# Patient Record
Sex: MALE | Race: WHITE | Employment: FULL TIME | URBAN - METROPOLITAN AREA
[De-identification: names, ages, dates, MRNs, and addresses within clinical notes are randomized per-mention and may not be internally consistent; named-entity substitution may affect disease eponyms.]

---

## 2017-11-12 ENCOUNTER — OFFICE VISIT (OUTPATIENT)
Dept: FAMILY MEDICINE CLINIC | Age: 52
End: 2017-11-12
Payer: COMMERCIAL

## 2017-11-12 VITALS
HEART RATE: 74 BPM | SYSTOLIC BLOOD PRESSURE: 120 MMHG | TEMPERATURE: 97.9 F | DIASTOLIC BLOOD PRESSURE: 76 MMHG | HEIGHT: 78 IN | WEIGHT: 274 LBS | BODY MASS INDEX: 31.7 KG/M2

## 2017-11-12 DIAGNOSIS — M54.50 ACUTE RIGHT-SIDED LOW BACK PAIN WITHOUT SCIATICA: Primary | ICD-10-CM

## 2017-11-12 PROCEDURE — 99213 OFFICE O/P EST LOW 20 MIN: CPT | Performed by: INTERNAL MEDICINE

## 2017-11-12 RX ORDER — TADALAFIL 20 MG
1 TABLET ORAL PRN
Refills: 2 | COMMUNITY
Start: 2017-10-29

## 2017-11-12 RX ORDER — CYCLOBENZAPRINE HCL 10 MG
10 TABLET ORAL 3 TIMES DAILY PRN
Qty: 21 TABLET | Refills: 0 | Status: SHIPPED | OUTPATIENT
Start: 2017-11-12 | End: 2017-11-12 | Stop reason: SDUPTHER

## 2017-11-12 RX ORDER — AMLODIPINE AND OLMESARTAN MEDOXOMIL 5; 40 MG/1; MG/1
1 TABLET ORAL DAILY
COMMUNITY
Start: 2017-11-09

## 2017-11-12 RX ORDER — CYCLOBENZAPRINE HCL 10 MG
10 TABLET ORAL 3 TIMES DAILY PRN
Qty: 21 TABLET | Refills: 0 | Status: SHIPPED | OUTPATIENT
Start: 2017-11-12 | End: 2017-11-22

## 2017-11-12 ASSESSMENT — ENCOUNTER SYMPTOMS
SHORTNESS OF BREATH: 0
COUGH: 0
BACK PAIN: 1

## 2017-11-12 NOTE — LETTER
[facility]   [address] [address]  Phone: [phone]  Fax: [phone]         November 12, 2017     Patient: Bartolo Guerrero   YOB: 1965   Date of Visit: 11/12/2017       To Whom It May Concern:    Bartolo Guerrero was seen and treated in our emergency department on 11/12/2017. The following work duties are recommended. He should remain out of work until 11/15/17    Treatment and work recommendations given by the emergency department are initial emergency measures only, and follow up should be arranged as soon as possible with the company's occupational health provider* or the specialist to whom the worker was referred. *If the company does not have an occupational health provider, follow up should be at No follow-up provider specified.         Sincerely,        Natalie Gonzalez MD        Signature:__________________________________

## 2017-11-12 NOTE — PATIENT INSTRUCTIONS
Patient Education        Low Back Pain: Exercises  Your Care Instructions  Here are some examples of typical rehabilitation exercises for your condition. Start each exercise slowly. Ease off the exercise if you start to have pain. Your doctor or physical therapist will tell you when you can start these exercises and which ones will work best for you. How to do the exercises  Press-up    1. Lie on your stomach, supporting your body with your forearms. 2. Press your elbows down into the floor to raise your upper back. As you do this, relax your stomach muscles and allow your back to arch without using your back muscles. As your press up, do not let your hips or pelvis come off the floor. 3. Hold for 15 to 30 seconds, then relax. 4. Repeat 2 to 4 times. Alternate arm and leg (bird dog) exercise    Note: Do this exercise slowly. Try to keep your body straight at all times, and do not let one hip drop lower than the other. 1. Start on the floor, on your hands and knees. 2. Tighten your belly muscles. 3. Raise one leg off the floor, and hold it straight out behind you. Be careful not to let your hip drop down, because that will twist your trunk. 4. Hold for about 6 seconds, then lower your leg and switch to the other leg. 5. Repeat 8 to 12 times on each leg. 6. Over time, work up to holding for 10 to 30 seconds each time. 7. If you feel stable and secure with your leg raised, try raising the opposite arm straight out in front of you at the same time. Knee-to-chest exercise    1. Lie on your back with your knees bent and your feet flat on the floor. 2. Bring one knee to your chest, keeping the other foot flat on the floor (or keeping the other leg straight, whichever feels better on your lower back). 3. Keep your lower back pressed to the floor. Hold for at least 15 to 30 seconds. 4. Relax, and lower the knee to the starting position. 5. Repeat with the other leg.  Repeat 2 to 4 times with each leg.  6. To get more stretch, put your other leg flat on the floor while pulling your knee to your chest.  Curl-ups    1. Lie on the floor on your back with your knees bent at a 90-degree angle. Your feet should be flat on the floor, about 12 inches from your buttocks. 2. Cross your arms over your chest. If this bothers your neck, try putting your hands behind your neck (not your head), with your elbows spread apart. 3. Slowly tighten your belly muscles and raise your shoulder blades off the floor. 4. Keep your head in line with your body, and do not press your chin to your chest.  5. Hold this position for 1 or 2 seconds, then slowly lower yourself back down to the floor. 6. Repeat 8 to 12 times. Pelvic tilt exercise    1. Lie on your back with your knees bent. 2. \"Brace\" your stomach. This means to tighten your muscles by pulling in and imagining your belly button moving toward your spine. You should feel like your back is pressing to the floor and your hips and pelvis are rocking back. 3. Hold for about 6 seconds while you breathe smoothly. 4. Repeat 8 to 12 times. Heel dig bridging    1. Lie on your back with both knees bent and your ankles bent so that only your heels are digging into the floor. Your knees should be bent about 90 degrees. 2. Then push your heels into the floor, squeeze your buttocks, and lift your hips off the floor until your shoulders, hips, and knees are all in a straight line. 3. Hold for about 6 seconds as you continue to breathe normally, and then slowly lower your hips back down to the floor and rest for up to 10 seconds. 4. Do 8 to 12 repetitions. Hamstring stretch in doorway    1. Lie on your back in a doorway, with one leg through the open door. 2. Slide your leg up the wall to straighten your knee. You should feel a gentle stretch down the back of your leg. 3. Hold the stretch for at least 15 to 30 seconds. Do not arch your back, point your toes, or bend either knee.

## 2022-09-28 ENCOUNTER — OFFICE VISIT (OUTPATIENT)
Dept: ORTHOPEDIC SURGERY | Age: 57
End: 2022-09-28
Payer: COMMERCIAL

## 2022-09-28 VITALS — WEIGHT: 274 LBS | RESPIRATION RATE: 14 BRPM | HEIGHT: 78 IN | BODY MASS INDEX: 31.7 KG/M2

## 2022-09-28 DIAGNOSIS — M25.552 HIP PAIN, LEFT: Primary | ICD-10-CM

## 2022-09-28 PROCEDURE — 99204 OFFICE O/P NEW MOD 45 MIN: CPT | Performed by: PHYSICIAN ASSISTANT

## 2022-09-28 RX ORDER — METHYLPREDNISOLONE 4 MG/1
TABLET ORAL
Qty: 1 KIT | Refills: 0 | Status: SHIPPED | OUTPATIENT
Start: 2022-09-28 | End: 2022-10-04

## 2022-09-28 NOTE — PROGRESS NOTES
[address] [facility], [address] [place], [address] [place] [facility] [address].           Dept Phone: [phone]           Dept Fax:  [phone] [address] [facility], [place]          Dept Phone: [phone]           Dept Fax:  [phone]      Chief Compliant:  Chief Complaint   Patient presents with    Hip Pain     Left        History of Present Illness: This is a 65 y.o. male who presents to the clinic today for evaluation of had concerns including Hip Pain (Left). Mr. Rivera Muhammad is a 81-year-old gentleman who presents for evaluation of 2-month history of left hip pain. Patient does not recall any specific injury or trauma prior to the onset of pain. He states the only thing abnormality did prior to the onset of pain was he did use a new inversion table but does not recall 1 specific incident of using this creatinine onset of pain. Patient describes the pain as a \"ache\" especially present when sleeping. He states it has came and went however has been relatively constant ache more recently. Does seem to be relieved more he walks and loosens up. He reports he has had issues with right-sided low back pain over the years and does have some occasional with this but different than his chronic issues of the low back. He states pain is most severe to the anterior aspect of the left hip and states it feels \"muscular\" in nature. Does note some radiation pain to the lateral hip and down the lateral thigh he denies any numbness or tingling. No bowel bladder function no saddle anesthesia no fever chills.        Past History:    Current Outpatient Medications:     methylPREDNISolone (MEDROL DOSEPACK) 4 MG tablet, Take by mouth., Disp: 1 kit, Rfl: 0    amLODIPine-olmesartan (ZA) 5-40 MG per tablet, Take 1 tablet by mouth daily, Disp: , Rfl:     aspirin 81 MG tablet, Take 1 tablet by mouth daily, Disp: , Rfl:     CIALIS 20 MG tablet, Take 1 tablet by mouth as needed, Disp: , Rfl: 2    albuterol (PROVENTIL HFA;VENTOLIN HFA) 108 (90 BASE) MCG/ACT inhaler, Inhale 2 puffs into the lungs every 6 hours as needed for Wheezing (hay fever related asthma  takes rarely). , Disp: , Rfl:   Allergies   Allergen Reactions    Erythromycin      Social History     Socioeconomic History    Marital status:      Spouse name: Not on file    Number of children: Not on file    Years of education: Not on file    Highest education level: Not on file   Occupational History    Not on file   Tobacco Use    Smoking status: Never    Smokeless tobacco: Former   Substance and Sexual Activity    Alcohol use: Not on file    Drug use: Not on file    Sexual activity: Not on file   Other Topics Concern    Not on file   Social History Narrative    Not on file     Social Determinants of Health     Financial Resource Strain: Not on file   Food Insecurity: Not on file   Transportation Needs: Not on file   Physical Activity: Not on file   Stress: Not on file   Social Connections: Not on file   Intimate Partner Violence: Not on file   Housing Stability: Not on file     Past Medical History:   Diagnosis Date    Asthma     hay fever related    Hypertension      No past surgical history on file. No family history on file. Review of Systems   Constitutional: Negative for fever, chills, sweats. Eyes: Negative for changes in vision, or pain. HENT: Negative for ear ache, epistaxis, or sore throat. Respiratory/Cardio: Negative for Chest pain, palpitations, SOB, or cough. Gastrointestinal: Negative for abdominal pain, N/V/D. Genitourinary: Negative for dysuria, frequency, urgency, or hematuria. Neurological: Negative for headache, numbness, or weakness. Integumentary: Negative for rash, itching, laceration, or abrasion.    Musculoskeletal: Positive for Hip Pain (Left)       Physical Exam:  Constitutional: Patient is oriented to person, place, and time. Patient appears well-developed and well nourished. HENT: Negative otherwise noted  Head: Normocephalic and Atraumatic  Nose: Normal  Eyes: Conjunctivae and EOM are normal  Neck: Normal range of motion Neck supple. Respiratory/Cardio: Effort normal. No respiratory distress. Musculoskeletal:    leftHip    Tenderness: Mild tenderness to the anterior hip at the ASIS area. No tenderness to the lateral hip or posterior hip. No tenderness to the SI joint or left lumbar area. Range of Motion:      Extension: 20     Flexion: 120     Internal Rotation: 15     External Rotation: 40     Abduction: Normal     Adduction:  Normal       Muscle Strength      Abduction: 5/5     Adduction: 5/5     Flexion: 4/5 pain and mild weakness with resisted hip flexion compared to contralateral lower extremity. Gait: Normal   Harsha Test: Negative   Rena Test: Negative       Neurological: Patient is alert and oriented to person, place, and time. Normal strenght. No sensory deficit. Skin: Skin is warm and dry  Psychiatric: Behavior is normal. Thought content normal.  Nursing note and vitals reviewed. Labs and Imaging:     CT ABDOMEN PELVIS WO IV CONTRAST  ** FINAL **  Procedure:  CT ABD AND PELVIS WOUT CONTRAST   [facility]  12/15/2014     [id number]  Reason for Exam:  Manish Fortino flank, RLQ abd pain    FULL RESULT:   EXAM:  CT ABD AND PELVIS WOUT CONTRAST    CLINICAL INDICATION: Right flank, RLQ abd pain    TECHNIQUE: Axial CT images of the abdomen and pelvis were obtained   without intravenous contrast.  Coronal reconstructions were performed. Oral Contrast: Oral contrast was not administered. COMPARISON:  No pertinent prior studies have been submitted for   comparison. FINDINGS:    Lung Bases:  Within normal limits. Liver: There is mild diffuse hypoattenuation of the liver, compatible   with hepatic steatosis. Biliary System:   Within normal limits. Spleen:  Within normal limits. Pancreas:  Within normal limits. Adrenal Glands: There is mild diffuse right adrenal gland thickening. Kidneys, Ureters, and Bladder: There is a 4 mm right ureterovesicular   junction obstructing calculus causing mild-moderate right-sided   hydroureteronephrosis. There is no evidence of left-sided hydronephrosis   or nephrolithiasis. The urinary bladder is collapsed, limiting   evaluation. Lymph nodes:  Within normal limits. Aorta/Retroperitoneum:  Within normal limits. Bowel: Within normal limits. A normal appendix is identified in the   right lower abdomen. Pelvic Organs: Within normal limits. Abdominal/Pelvic Wall:  Within normal limits. Ascites:  No evidence of ascites. Other Findings:  No other significant findings are noted. Osseous Structures:  No significant abnormality. IMPRESSION:     1. A 4 mm right UVJ obstructing calculus causing mild-moderate   right-sided hydroureteronephrosis. 2.  No additional radiopaque renal calculi. 3.  Hepatic steatosis. Report electronically signed by Rakesh Gutierrez MD on 12/15/2014 10:10 PM  Transcribed by: [facility] on Dec 15 2014 10:14P   Read by:  Sandra Rojas M.D.  [id number] on Dec 15 2014 10:14P   Electronically Signed by:  DR. Sandra Rojas M.D. on:  Dec 15 2014 10:14P                                                                                     X-rays taken in clinic today and preliminarily reviewed by me 9/28/22:  AP pelvis lateral view of the left hip demonstrates early degenerative changes with slight superior lateral narrowing compared to right hip on AP pelvis view. No evidence of acute fracture or other acute osseous abnormality. Orders Placed This Encounter   Procedures    XR HIP 1 VW W PELVIS LEFT     Standing Status:   Future     Number of Occurrences:   1     Standing Expiration Date:   9/26/2023       Assessment and Plan:  1.  Hip pain, left          PLAN:  Erlinda oRminikki is a 65 y.o. old male who presents for 2-month history of left hip pain consistent with left hip flexor tendinitis versus iliopsoas bursitis. Patient with evidence of mild osteoarthritis however pain seems superficial to the actual hip joint. I had a discussion with the patient with regards to the nature and extent of his problem. We also discussed treatment options available to him including non-operative and operative intervention. To this end we discussed use of NSAIDs, cortisone, weight loss, activity modification, physical therapy, and use of assistive walking devices. After discussion we elected proceed with exercise program.  Did discuss possibility of referral to formal physical therapy but patient elected to try home exercise program first.    We will start patient on a Medrol Dosepak. Should patient continue be painful at 4-week follow-up we will discuss possibility of referral to IR for iliopsoas tendon injection. Electronically signed by Josh Vang on 9/28/22 at 9:03 AM EDT        Please note that this chart was generated using voice recognition Dragon dictation software. Although every effort was made to ensure the accuracy of this automated transcription, some errors in transcription may have occurred.

## 2022-11-02 ENCOUNTER — OFFICE VISIT (OUTPATIENT)
Dept: ORTHOPEDIC SURGERY | Age: 57
End: 2022-11-02
Payer: COMMERCIAL

## 2022-11-02 VITALS — WEIGHT: 265 LBS | RESPIRATION RATE: 14 BRPM | BODY MASS INDEX: 32.95 KG/M2 | HEIGHT: 75 IN

## 2022-11-02 DIAGNOSIS — M70.72 ILIOPSOAS BURSITIS OF LEFT HIP: Primary | ICD-10-CM

## 2022-11-02 PROCEDURE — 99214 OFFICE O/P EST MOD 30 MIN: CPT | Performed by: PHYSICIAN ASSISTANT

## 2022-11-02 NOTE — PROGRESS NOTES
[address] [facility], [address] [place] [address] Road [address] [place] [address], [address] [place] [address], [id number] [place] [address]           Dept Phone: [phone]           Dept Fax:  [phone] [facility]           [place]          Dept Phone: [phone]           Dept Fax:  [phone]      Chief Compliant:  Chief Complaint   Patient presents with    Hip Pain     left        History of Present Illness: This is a 65 y.o. male who presents to the clinic today for evaluation of had concerns including Hip Pain (left). Mr. Denisha Anton is a 60-year-old gentleman who returns today for reevaluation of left hip pain. Patient was initially evaluated on 9/20/2022 pain was concerning for iliopsoas tendinitis. Radiographically patient did have evidence of early osteoarthritis but pain was most consistent with tendinitis. Patient was started on a Medrol Dosepak at that time and provided with home exercises/stretches. He reports that while taking the medication he actually felt very good but states approximately 2 days after completion of steroid prescription his pain gradually returned and it has been similar over the last 3 weeks as it was too prior to the medication. Patient continues to localize pain most severely to the left anterior hip/groin area. He reports his greatest difficulty is lifting the left leg up especially to get in and out of the car and going up stairs. Patient denies any radiation pain down the leg no posterior or lateral hip pain no numbness or tingling.        Past History:    Current Outpatient Medications:     amLODIPine-olmesartan (ZA) 5-40 MG per tablet, Take 1 tablet by mouth daily, Disp: , Rfl:     aspirin 81 MG tablet, Take 1 tablet by mouth daily, Disp: , Rfl:     CIALIS 20 MG tablet, Take 1 tablet by mouth as needed, Disp: , Rfl: 2    albuterol (PROVENTIL HFA;VENTOLIN HFA) 108 (90 BASE) MCG/ACT inhaler, Inhale 2 puffs into the lungs every 6 hours as needed for Wheezing (hay fever related asthma  takes rarely). , Disp: , Rfl:   Allergies   Allergen Reactions    Erythromycin      Social History     Socioeconomic History    Marital status:      Spouse name: Not on file    Number of children: Not on file    Years of education: Not on file    Highest education level: Not on file   Occupational History    Not on file   Tobacco Use    Smoking status: Never    Smokeless tobacco: Former   Substance and Sexual Activity    Alcohol use: Not on file    Drug use: Not on file    Sexual activity: Not on file   Other Topics Concern    Not on file   Social History Narrative    Not on file     Social Determinants of Health     Financial Resource Strain: Not on file   Food Insecurity: Not on file   Transportation Needs: Not on file   Physical Activity: Not on file   Stress: Not on file   Social Connections: Not on file   Intimate Partner Violence: Not on file   Housing Stability: Not on file     Past Medical History:   Diagnosis Date    Asthma     hay fever related    Hypertension      No past surgical history on file. No family history on file. Review of Systems   Constitutional: Negative for fever, chills, sweats. Eyes: Negative for changes in vision, or pain. HENT: Negative for ear ache, epistaxis, or sore throat. Respiratory/Cardio: Negative for Chest pain, palpitations, SOB, or cough. Gastrointestinal: Negative for abdominal pain, N/V/D. Genitourinary: Negative for dysuria, frequency, urgency, or hematuria. Neurological: Negative for headache, numbness, or weakness. Integumentary: Negative for rash, itching, laceration, or abrasion. Musculoskeletal: Positive for Hip Pain (left)       Physical Exam:  Constitutional: Patient is oriented to person, place, and time. Patient appears well-developed and well nourished.    HENT: Negative otherwise noted  Head: Normocephalic and Atraumatic  Nose: Normal  Eyes: Conjunctivae and EOM are normal  Neck: Normal range of motion Neck supple. Respiratory/Cardio: Effort normal. No respiratory distress. Musculoskeletal:    leftHip     Tenderness: Mild tenderness to the anterior hip at the ASIS area. No tenderness to the lateral hip or posterior hip. No tenderness to the SI joint or left lumbar area. Range of Motion:       Extension: 20     Flexion: 120     Internal Rotation: 15     External Rotation: 40     Abduction: Normal     Adduction:  Normal         Muscle Strength       Abduction: 5/5     Adduction: 5/5     Flexion: 4/5 pain and mild weakness with resisted hip flexion compared to contralateral lower extremity. Gait: Normal   Harsha Test: Negative   Rena Test: Negative       Neurological: Patient is alert and oriented to person, place, and time. Normal strenght. No sensory deficit. Skin: Skin is warm and dry  Psychiatric: Behavior is normal. Thought content normal.  Nursing note and vitals reviewed. Labs and Imaging:     No new x-rays taken today however those from 9/28/2022 again reviewed by me  AP pelvis and lateral view of the left hip demonstrates mild superior lateral narrowing of the left hip joint compared to the right minimal osteophyte formation no evidence of acute fracture, AVN, subluxation or dislocation       Orders Placed This Encounter   Procedures    IR ARTHR/ASP/INJ MAJOR JT/BURSA LEFT WO US     INJECTION PREFERRED PROTOCOL       FOR  ___Iliopsoas bursal left hip_______   INJECTION:      4 cc Xylocaine, 1% plain  4 cc Marcaine, 0.5%  1 cc Depomedrol 80 mgm/cc OR          Celestone 6 mgm/cc     Standing Status:   Future     Standing Expiration Date:   11/2/2023       Assessment and Plan:  1. Iliopsoas bursitis of left hip          PLAN:  Juanis Hubbard is a 65 y.o. old male with left hip pain consistent with iliopsoas tendinitis.   Patient with evidence of very mild osteoarthritis left hip pain most consistent with hip flexor/iliopsoas tendinitis. I had a discussion with the patient with regards to the nature and extent of his problem. We also discussed treatment options available to him including non-operative and operative intervention. To this end we discussed use of NSAIDs, cortisone, weight loss, activity modification, physical therapy, and use of assistive walking devices. As outlined above he has attempted treatment to include use of Medrol Dosepak and home exercise program. At this time he would like to proceed with corticosteroid injection. Referral to IR for left iliopsoas injection is provided. Follow-up in 3 months however patient may call or return sooner for any questions or concerns      Electronically signed by AYO Booth on 11/2/22 at 4:37 PM EDT        Please note that this chart was generated using voice recognition Dragon dictation software. Although every effort was made to ensure the accuracy of this automated transcription, some errors in transcription may have occurred.

## 2022-11-03 ENCOUNTER — TELEPHONE (OUTPATIENT)
Dept: INTERVENTIONAL RADIOLOGY/VASCULAR | Age: 57
End: 2022-11-03

## 2022-11-03 NOTE — TELEPHONE ENCOUNTER
I spoke with the patients wife. She will call back to schedule the injection after she speaks to her .

## 2022-11-11 ENCOUNTER — HOSPITAL ENCOUNTER (OUTPATIENT)
Dept: INTERVENTIONAL RADIOLOGY/VASCULAR | Age: 57
Discharge: HOME | End: 2022-11-13
Payer: COMMERCIAL

## 2022-11-11 DIAGNOSIS — M70.72 ILIOPSOAS BURSITIS OF LEFT HIP: ICD-10-CM

## 2022-11-11 PROCEDURE — 6360000002 HC RX W HCPCS: Performed by: PHYSICIAN ASSISTANT

## 2022-11-11 PROCEDURE — 2500000003 HC RX 250 WO HCPCS: Performed by: PHYSICIAN ASSISTANT

## 2022-11-11 PROCEDURE — 2709999900 IR ARTHR/ASP/INJ MAJOR JT/BURSA LEFT WO US

## 2022-11-11 PROCEDURE — 20611 DRAIN/INJ JOINT/BURSA W/US: CPT

## 2022-11-11 RX ORDER — LIDOCAINE HYDROCHLORIDE 10 MG/ML
4 INJECTION, SOLUTION INFILTRATION; PERINEURAL ONCE
Status: DISCONTINUED | OUTPATIENT
Start: 2022-11-11 | End: 2022-11-14 | Stop reason: HOSPADM

## 2022-11-11 RX ORDER — METHYLPREDNISOLONE ACETATE 80 MG/ML
80 INJECTION, SUSPENSION INTRA-ARTICULAR; INTRALESIONAL; INTRAMUSCULAR; SOFT TISSUE ONCE
Status: DISCONTINUED | OUTPATIENT
Start: 2022-11-11 | End: 2022-11-14 | Stop reason: HOSPADM

## 2022-11-11 RX ORDER — METHYLPREDNISOLONE ACETATE 80 MG/ML
80 INJECTION, SUSPENSION INTRA-ARTICULAR; INTRALESIONAL; INTRAMUSCULAR; SOFT TISSUE ONCE
Status: COMPLETED | OUTPATIENT
Start: 2022-11-11 | End: 2022-11-11

## 2022-11-11 RX ORDER — BUPIVACAINE HYDROCHLORIDE 5 MG/ML
4 INJECTION, SOLUTION PERINEURAL ONCE
Status: DISCONTINUED | OUTPATIENT
Start: 2022-11-11 | End: 2022-11-14 | Stop reason: HOSPADM

## 2022-11-11 RX ORDER — BUPIVACAINE HYDROCHLORIDE 5 MG/ML
4 INJECTION, SOLUTION PERINEURAL ONCE
Status: COMPLETED | OUTPATIENT
Start: 2022-11-11 | End: 2022-11-11

## 2022-11-11 RX ORDER — LIDOCAINE HYDROCHLORIDE 10 MG/ML
4 INJECTION, SOLUTION EPIDURAL; INFILTRATION; INTRACAUDAL; PERINEURAL ONCE
Status: COMPLETED | OUTPATIENT
Start: 2022-11-11 | End: 2022-11-11

## 2022-11-11 RX ADMIN — BUPIVACAINE HYDROCHLORIDE 4 ML: 5 INJECTION, SOLUTION PERINEURAL at 15:36

## 2022-11-11 RX ADMIN — METHYLPREDNISOLONE ACETATE 80 MG: 80 INJECTION, SUSPENSION INTRA-ARTICULAR; INTRALESIONAL; INTRAMUSCULAR; SOFT TISSUE at 15:36

## 2022-11-11 RX ADMIN — LIDOCAINE HYDROCHLORIDE 4 ML: 10 INJECTION, SOLUTION EPIDURAL; INFILTRATION; INTRACAUDAL; PERINEURAL at 15:37
